# Patient Record
Sex: MALE | Race: WHITE | Employment: FULL TIME | URBAN - METROPOLITAN AREA
[De-identification: names, ages, dates, MRNs, and addresses within clinical notes are randomized per-mention and may not be internally consistent; named-entity substitution may affect disease eponyms.]

---

## 2021-05-31 ENCOUNTER — RECORDS - HEALTHEAST (OUTPATIENT)
Dept: ADMINISTRATIVE | Facility: CLINIC | Age: 71
End: 2021-05-31

## 2021-06-02 ENCOUNTER — RECORDS - HEALTHEAST (OUTPATIENT)
Dept: ADMINISTRATIVE | Facility: CLINIC | Age: 71
End: 2021-06-02

## 2021-10-08 ENCOUNTER — HOSPITAL ENCOUNTER (EMERGENCY)
Facility: HOSPITAL | Age: 71
Discharge: HOME | End: 2021-10-08
Payer: COMMERCIAL

## 2021-10-08 NOTE — PROGRESS NOTES
"Patient laid down onto floor- \"I can't sit in that chair, my back/shoulder hurts too much.\"  Unable to triage at this time  "

## 2021-10-08 NOTE — ED TRIAGE NOTES
EMS states that pt tripped on curb- no LOC, did not hit head, is not taking blood thinners.  Pt has hx of chronic back pain, pt states that he drinks ETOH to help pain, has been drinking this morning.